# Patient Record
Sex: MALE | Race: WHITE | NOT HISPANIC OR LATINO | Employment: FULL TIME | URBAN - METROPOLITAN AREA
[De-identification: names, ages, dates, MRNs, and addresses within clinical notes are randomized per-mention and may not be internally consistent; named-entity substitution may affect disease eponyms.]

---

## 2023-10-05 ENCOUNTER — TELEPHONE (OUTPATIENT)
Dept: ORTHOPEDICS | Facility: CLINIC | Age: 38
End: 2023-10-05
Payer: MEDICAID

## 2023-10-05 NOTE — TELEPHONE ENCOUNTER
----- Message from Sera Townsend sent at 10/5/2023  3:24 PM CDT -----  Regarding: 3:30 appt runninf 15 mins late  Contact: pt  Pt running late to appt: Traffic         Provider: Kenan PATEL  Caller: Pt  Appt time:3:30  ETA:3:45  Asking, will still be seen? Please call

## 2023-10-30 ENCOUNTER — OFFICE VISIT (OUTPATIENT)
Dept: ORTHOPEDICS | Facility: CLINIC | Age: 38
End: 2023-10-30
Payer: MEDICAID

## 2023-10-30 VITALS
SYSTOLIC BLOOD PRESSURE: 161 MMHG | BODY MASS INDEX: 30.85 KG/M2 | HEIGHT: 72 IN | HEART RATE: 81 BPM | DIASTOLIC BLOOD PRESSURE: 94 MMHG | WEIGHT: 227.75 LBS

## 2023-10-30 DIAGNOSIS — M75.21 BICEPS TENDONITIS ON RIGHT: Primary | ICD-10-CM

## 2023-10-30 DIAGNOSIS — S46.911A STRAIN OF RIGHT SHOULDER, INITIAL ENCOUNTER: ICD-10-CM

## 2023-10-30 PROCEDURE — 3080F PR MOST RECENT DIASTOLIC BLOOD PRESSURE >= 90 MM HG: ICD-10-PCS | Mod: CPTII,,,

## 2023-10-30 PROCEDURE — 99203 OFFICE O/P NEW LOW 30 MIN: CPT | Mod: S$PBB,,,

## 2023-10-30 PROCEDURE — 1159F MED LIST DOCD IN RCRD: CPT | Mod: CPTII,,,

## 2023-10-30 PROCEDURE — 3077F SYST BP >= 140 MM HG: CPT | Mod: CPTII,,,

## 2023-10-30 PROCEDURE — 1159F PR MEDICATION LIST DOCUMENTED IN MEDICAL RECORD: ICD-10-PCS | Mod: CPTII,,,

## 2023-10-30 PROCEDURE — 99999 PR PBB SHADOW E&M-EST. PATIENT-LVL III: ICD-10-PCS | Mod: PBBFAC,,,

## 2023-10-30 PROCEDURE — 3008F BODY MASS INDEX DOCD: CPT | Mod: CPTII,,,

## 2023-10-30 PROCEDURE — 99213 OFFICE O/P EST LOW 20 MIN: CPT | Mod: PBBFAC,PN

## 2023-10-30 PROCEDURE — 1160F PR REVIEW ALL MEDS BY PRESCRIBER/CLIN PHARMACIST DOCUMENTED: ICD-10-PCS | Mod: CPTII,,,

## 2023-10-30 PROCEDURE — 3077F PR MOST RECENT SYSTOLIC BLOOD PRESSURE >= 140 MM HG: ICD-10-PCS | Mod: CPTII,,,

## 2023-10-30 PROCEDURE — 99203 PR OFFICE/OUTPT VISIT, NEW, LEVL III, 30-44 MIN: ICD-10-PCS | Mod: S$PBB,,,

## 2023-10-30 PROCEDURE — 3080F DIAST BP >= 90 MM HG: CPT | Mod: CPTII,,,

## 2023-10-30 PROCEDURE — 99999 PR PBB SHADOW E&M-EST. PATIENT-LVL III: CPT | Mod: PBBFAC,,,

## 2023-10-30 PROCEDURE — 1160F RVW MEDS BY RX/DR IN RCRD: CPT | Mod: CPTII,,,

## 2023-10-30 PROCEDURE — 3008F PR BODY MASS INDEX (BMI) DOCUMENTED: ICD-10-PCS | Mod: CPTII,,,

## 2023-10-30 RX ORDER — NAPROXEN 500 MG/1
500 TABLET ORAL 2 TIMES DAILY
Qty: 60 TABLET | Refills: 1 | Status: SHIPPED | OUTPATIENT
Start: 2023-10-30

## 2023-10-30 RX ORDER — METHADONE HYDROCHLORIDE 40 MG/1
TABLET ORAL
COMMUNITY
Start: 2020-11-17

## 2023-10-30 RX ORDER — METHYLPREDNISOLONE 4 MG/1
TABLET ORAL
Qty: 21 EACH | Refills: 0 | Status: SHIPPED | OUTPATIENT
Start: 2023-10-30 | End: 2023-11-20
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
## 2024-01-17 ENCOUNTER — TELEPHONE (OUTPATIENT)
Dept: ORTHOPEDICS | Facility: CLINIC | Age: 39
End: 2024-01-17
Payer: MEDICAID

## 2024-06-10 ENCOUNTER — HOSPITAL ENCOUNTER (EMERGENCY)
Facility: HOSPITAL | Age: 39
Discharge: HOME | End: 2024-06-11
Attending: STUDENT IN AN ORGANIZED HEALTH CARE EDUCATION/TRAINING PROGRAM
Payer: MEDICAID

## 2024-06-10 DIAGNOSIS — M79.89 LEG SWELLING: ICD-10-CM

## 2024-06-10 DIAGNOSIS — I82.431 ACUTE DEEP VEIN THROMBOSIS (DVT) OF POPLITEAL VEIN OF RIGHT LOWER EXTREMITY: Primary | ICD-10-CM

## 2024-06-10 LAB
ALBUMIN SERPL BCP-MCNC: 3.6 G/DL (ref 3.5–5.2)
ALP SERPL-CCNC: 89 U/L (ref 55–135)
ALT SERPL W/O P-5'-P-CCNC: 19 U/L (ref 10–44)
ANION GAP SERPL CALC-SCNC: 11 MMOL/L (ref 8–16)
AST SERPL-CCNC: 9 U/L (ref 10–40)
BILIRUB SERPL-MCNC: 0.4 MG/DL (ref 0.1–1)
BUN SERPL-MCNC: 11 MG/DL (ref 6–20)
CALCIUM SERPL-MCNC: 9 MG/DL (ref 8.7–10.5)
CHLORIDE SERPL-SCNC: 103 MMOL/L (ref 95–110)
CK SERPL-CCNC: 87 U/L (ref 20–200)
CO2 SERPL-SCNC: 24 MMOL/L (ref 23–29)
CREAT SERPL-MCNC: 0.9 MG/DL (ref 0.5–1.4)
EST. GFR  (NO RACE VARIABLE): >60 ML/MIN/1.73 M^2
GLUCOSE SERPL-MCNC: 87 MG/DL (ref 70–110)
HCV AB SERPL QL IA: NORMAL
HIV 1+2 AB+HIV1 P24 AG SERPL QL IA: NORMAL
POTASSIUM SERPL-SCNC: 3.6 MMOL/L (ref 3.5–5.1)
PROT SERPL-MCNC: 7.1 G/DL (ref 6–8.4)
SODIUM SERPL-SCNC: 138 MMOL/L (ref 136–145)

## 2024-06-10 PROCEDURE — 87389 HIV-1 AG W/HIV-1&-2 AB AG IA: CPT | Performed by: PHYSICIAN ASSISTANT

## 2024-06-10 PROCEDURE — 82550 ASSAY OF CK (CPK): CPT | Performed by: EMERGENCY MEDICINE

## 2024-06-10 PROCEDURE — 99284 EMERGENCY DEPT VISIT MOD MDM: CPT | Mod: 25

## 2024-06-10 PROCEDURE — 86803 HEPATITIS C AB TEST: CPT | Performed by: PHYSICIAN ASSISTANT

## 2024-06-10 PROCEDURE — 80053 COMPREHEN METABOLIC PANEL: CPT | Performed by: EMERGENCY MEDICINE

## 2024-06-10 PROCEDURE — 25000003 PHARM REV CODE 250: Performed by: EMERGENCY MEDICINE

## 2024-06-10 PROCEDURE — 85025 COMPLETE CBC W/AUTO DIFF WBC: CPT | Performed by: EMERGENCY MEDICINE

## 2024-06-10 RX ORDER — ACETAMINOPHEN 500 MG
1000 TABLET ORAL
Status: COMPLETED | OUTPATIENT
Start: 2024-06-10 | End: 2024-06-10

## 2024-06-10 RX ORDER — APIXABAN 5 MG (74)
KIT ORAL
Qty: 74 EACH | Refills: 0 | Status: SHIPPED | OUTPATIENT
Start: 2024-06-10

## 2024-06-10 RX ORDER — IBUPROFEN 400 MG/1
800 TABLET ORAL
Status: COMPLETED | OUTPATIENT
Start: 2024-06-10 | End: 2024-06-10

## 2024-06-10 RX ADMIN — IBUPROFEN 800 MG: 400 TABLET ORAL at 10:06

## 2024-06-10 RX ADMIN — ACETAMINOPHEN 1000 MG: 500 TABLET ORAL at 10:06

## 2024-06-10 RX ADMIN — APIXABAN 10 MG: 5 TABLET, FILM COATED ORAL at 11:06

## 2024-06-10 NOTE — FIRST PROVIDER EVALUATION
Medical screening examination initiated.  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    Pertinent physical exam:  R calf swollen    Brief workup plan:  US    Preliminary workup initiated; this workup will be continued and followed by the physician or advanced practice provider that is assigned to the patient when roomed.

## 2024-06-10 NOTE — Clinical Note
"Jarrod Vega"Sanjiv was seen and treated in our emergency department on 6/10/2024.  He may return to work on 06/13/2024.       If you have any questions or concerns, please don't hesitate to call.      Kurt Traore Jr., MD"

## 2024-06-11 VITALS
TEMPERATURE: 98 F | HEART RATE: 69 BPM | SYSTOLIC BLOOD PRESSURE: 134 MMHG | HEIGHT: 72 IN | WEIGHT: 260 LBS | RESPIRATION RATE: 16 BRPM | OXYGEN SATURATION: 99 % | BODY MASS INDEX: 35.21 KG/M2 | DIASTOLIC BLOOD PRESSURE: 84 MMHG

## 2024-06-11 LAB
BASOPHILS # BLD AUTO: 0.09 K/UL (ref 0–0.2)
BASOPHILS NFR BLD: 0.5 % (ref 0–1.9)
DIFFERENTIAL METHOD BLD: ABNORMAL
EOSINOPHIL # BLD AUTO: 0.2 K/UL (ref 0–0.5)
EOSINOPHIL NFR BLD: 1.2 % (ref 0–8)
ERYTHROCYTE [DISTWIDTH] IN BLOOD BY AUTOMATED COUNT: 14.4 % (ref 11.5–14.5)
HCT VFR BLD AUTO: 43.5 % (ref 40–54)
HGB BLD-MCNC: 13.6 G/DL (ref 14–18)
IMM GRANULOCYTES # BLD AUTO: 0.08 K/UL (ref 0–0.04)
IMM GRANULOCYTES NFR BLD AUTO: 0.4 % (ref 0–0.5)
LYMPHOCYTES # BLD AUTO: 3.1 K/UL (ref 1–4.8)
LYMPHOCYTES NFR BLD: 16.8 % (ref 18–48)
MCH RBC QN AUTO: 28.2 PG (ref 27–31)
MCHC RBC AUTO-ENTMCNC: 31.3 G/DL (ref 32–36)
MCV RBC AUTO: 90 FL (ref 82–98)
MONOCYTES # BLD AUTO: 1.2 K/UL (ref 0.3–1)
MONOCYTES NFR BLD: 6.6 % (ref 4–15)
NEUTROPHILS # BLD AUTO: 13.6 K/UL (ref 1.8–7.7)
NEUTROPHILS NFR BLD: 74.5 % (ref 38–73)
NRBC BLD-RTO: 0 /100 WBC
PLATELET # BLD AUTO: 197 K/UL (ref 150–450)
PMV BLD AUTO: 12.6 FL (ref 9.2–12.9)
RBC # BLD AUTO: 4.83 M/UL (ref 4.6–6.2)
WBC # BLD AUTO: 18.24 K/UL (ref 3.9–12.7)

## 2024-06-11 RX ORDER — CEPHALEXIN 500 MG/1
500 CAPSULE ORAL 4 TIMES DAILY
Qty: 20 CAPSULE | Refills: 0 | Status: SHIPPED | OUTPATIENT
Start: 2024-06-11 | End: 2024-06-16

## 2024-06-11 NOTE — PROVIDER PROGRESS NOTES - EMERGENCY DEPT.
Encounter Date: 6/10/2024    ED Physician Progress Notes        ED Course: I, Kurt Traore MD have assumed care of this patient from Dr. Morton. Patient is a   38-year-old presenting with lower extremity swelling.  Ultrasound noted for DVT.    At the time of signout plan was pending   Reassessment and laboratory testing.      Laboratory testing noted for leukocytosis.  On reassessment patient right lower extremity with mild erythema noted swelling no significant warmth to touch.  DP PT pulses 2+ in full compartments soft.  Noted mild vesicular rash on patient's upper medial leg.  Patient denies any infectious symptoms including fever chills night sweats cough nausea vomiting change in bowel or bladder habits or URI type symptoms.  Given presentation will DC home with Keflex for possible skin infection patient given return precautions including fevers, redness, swelling, pain patient understanding of plan safe for plan discharge home at this time patient has no further questions.    Medications given in the ED:    Medications   acetaminophen tablet 1,000 mg (1,000 mg Oral Given 6/10/24 2213)   ibuprofen tablet 800 mg (800 mg Oral Given 6/10/24 2213)       Disposition: d/c home     Impression:  DVT, leukocytosis

## 2024-06-11 NOTE — DISCHARGE INSTRUCTIONS
You are prescribed Eliquis for DVT, please follow the instruction in the package.  You will take 2 tablets twice daily for 7 days, then 1 tablet twice daily for the rest.   You can  the medication at [facility] 24/7  Referral to hematology for follow-up with your deep vein thrombosis    Please return if you began having worsening swelling, pain, fevers, redness or discoloration of leg or generally feeling worse.

## 2024-06-11 NOTE — ED TRIAGE NOTES
Pt presents to the ED with complaints of R. Leg pain and swelling. Pt had pain in R. Thigh starting Friday. Noticed Saturday pain, redness, and swelling in R. Calf, worse with standing and ambulating. Pt denies injury to the area.

## 2024-06-11 NOTE — ED PROVIDER NOTES
Encounter Date: 6/10/2024       History     Chief Complaint   Patient presents with    Leg Pain     Pain and swelling  to r leg, started r groin, in rehab clinic no narcotics per pt     38 y.o. M, presents to the ED for right lower leg swelling. Patient reports the right calf pain and swelling since 2 days ago, however, he noticed that he had right side groin pain prior to the swelling. 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 Denies any injury to his right leg. Denies chest pain, or shortness of breath.         Review of patient's allergies indicates:  No Known Allergies  Past Medical History:   Diagnosis Date    Pinched nerve      Past Surgical History:   Procedure Laterality Date    KNEE SURGERY Left      No family history on file.  Social History     Tobacco Use    Smoking status: Every Day     Current packs/day: 0.75     Types: Cigarettes   Substance Use Topics    Alcohol use: No    Drug use: No     Review of Systems    Physical Exam     Initial Vitals [06/10/24 1759]   BP Pulse Resp Temp SpO2   (!) 193/105 96 18 98.8 °F (37.1 °C) 97 %      MAP       --         Physical Exam    Nursing note and vitals reviewed.  Constitutional: He appears well-developed. No distress.   HENT:   Head: Normocephalic and atraumatic.   Nose: Nose normal.   Eyes: Conjunctivae and EOM are normal. Pupils are equal, round, and reactive to light.   Neck: No JVD present.   Normal range of motion.  Cardiovascular:  Normal rate, regular rhythm and normal heart sounds.           Pulmonary/Chest: Breath sounds normal. No respiratory distress.   Abdominal: Abdomen is soft. He exhibits no distension. There is no abdominal tenderness.   Musculoskeletal:         General: Tenderness and edema (right lower leg, positive Homman's test, palpable DP and TP pulses) present. Normal range of motion.      Cervical back: Normal range of motion.     Neurological: He is alert and oriented to  person, place, and time. He has normal strength. No cranial nerve deficit.   Skin: Skin is warm and dry. Capillary refill takes less than 2 seconds.         ED Course   Procedures  Labs Reviewed   HIV 1 / 2 ANTIBODY   HEPATITIS C ANTIBODY   CBC W/ AUTO DIFFERENTIAL   COMPREHENSIVE METABOLIC PANEL   CK          Imaging Results               US Lower Extremity Veins Right (Final result)  Result time 06/10/24 21:44:06      Final result by Phu Astudillo MD (06/10/24 21:44:06)                   Impression:      Right popliteal vein thrombosed.    This report was flagged in Epic as abnormal.    The critical information above was relayed directly by Phu Astudillo MD by [facility] secure chat to Gus Morton DO on 6/10/2024 at 21:43.      Electronically signed by: Phu Astudillo MD  Date:    06/10/2024  Time:    21:44               Narrative:    EXAMINATION:  US LOWER EXTREMITY VEINS RIGHT    CLINICAL HISTORY:  Other specified soft tissue disorders    TECHNIQUE:  Duplex and color flow Doppler evaluation and graded compression of the right lower extremity veins was performed.    COMPARISON:  None    FINDINGS:  Right thigh veins: The common femoral, femoral, upper greater saphenous, and deep femoral veins are patent and free of thrombus. The veins are normally compressible and have normal phasic flow and augmentation response.  Right popliteal vein thrombosed.    Right calf veins: The visualized calf veins are patent.    Contralateral CFV: The contralateral (left) common femoral vein is patent and free of thrombus.    Miscellaneous: None                                       Medications   apixaban tablet 10 mg (has no administration in time range)   acetaminophen tablet 1,000 mg (1,000 mg Oral Given 6/10/24 2213)   ibuprofen tablet 800 mg (800 mg Oral Given 6/10/24 2213)     Medical Decision Making  38 y.o. M, presents to the ED for right lower leg swelling. Concerning for DVT, doubt cellulitis or acute fracture, no  sign of PE, will obtain US DVT and labs to r/o rhabdo.     Amount and/or Complexity of Data Reviewed  Labs: ordered. Decision-making details documented in ED Course.  Radiology: ordered and independent interpretation performed. Decision-making details documented in ED Course.     Details: Right popliteal vein thrombosed    Risk  OTC drugs.  Prescription drug management.                                      Clinical Impression:  Final diagnoses:  [M79.89] Leg swelling  [I82.431] Acute deep vein thrombosis (DVT) of popliteal vein of right lower extremity (Primary)                 Christian Fishman MD  Resident  06/10/24 5189

## 2024-06-18 ENCOUNTER — OFFICE VISIT (OUTPATIENT)
Dept: HEMATOLOGY/ONCOLOGY | Facility: CLINIC | Age: 39
End: 2024-06-18
Payer: MEDICAID

## 2024-06-18 VITALS
TEMPERATURE: 98 F | SYSTOLIC BLOOD PRESSURE: 154 MMHG | HEIGHT: 73 IN | HEART RATE: 78 BPM | BODY MASS INDEX: 29.8 KG/M2 | RESPIRATION RATE: 20 BRPM | OXYGEN SATURATION: 98 % | DIASTOLIC BLOOD PRESSURE: 87 MMHG | WEIGHT: 224.88 LBS

## 2024-06-18 DIAGNOSIS — I82.431 ACUTE DEEP VEIN THROMBOSIS (DVT) OF POPLITEAL VEIN OF RIGHT LOWER EXTREMITY: ICD-10-CM

## 2024-06-18 PROCEDURE — 3008F BODY MASS INDEX DOCD: CPT | Mod: CPTII,,, | Performed by: INTERNAL MEDICINE

## 2024-06-18 PROCEDURE — 99213 OFFICE O/P EST LOW 20 MIN: CPT | Mod: PBBFAC,PN | Performed by: INTERNAL MEDICINE

## 2024-06-18 PROCEDURE — 99999 PR PBB SHADOW E&M-EST. PATIENT-LVL III: CPT | Mod: PBBFAC,,, | Performed by: INTERNAL MEDICINE

## 2024-06-18 PROCEDURE — 3079F DIAST BP 80-89 MM HG: CPT | Mod: CPTII,,, | Performed by: INTERNAL MEDICINE

## 2024-06-18 PROCEDURE — 3077F SYST BP >= 140 MM HG: CPT | Mod: CPTII,,, | Performed by: INTERNAL MEDICINE

## 2024-06-18 PROCEDURE — 1159F MED LIST DOCD IN RCRD: CPT | Mod: CPTII,,, | Performed by: INTERNAL MEDICINE

## 2024-06-18 PROCEDURE — 99204 OFFICE O/P NEW MOD 45 MIN: CPT | Mod: S$PBB,,, | Performed by: INTERNAL MEDICINE

## 2024-06-18 NOTE — PROGRESS NOTES
HPI    38 y.o. M, was presents to the ED on 6/10/24 for right lower leg swelling. Patient reports the right calf pain and swelling since 6/8/24 days ago, however, he noticed that he had right side groin pain prior to the swelling. 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 Denies any injury to his right leg. Denies chest pain, or shortness of breath.       Past Medical History:   Diagnosis Date    Pinched nerve      Social History     Socioeconomic History    Marital status: Single   Tobacco Use    Smoking status: Every Day     Current packs/day: 0.75     Types: Cigarettes   Substance and Sexual Activity    Alcohol use: No    Drug use: No     Social Determinants of Health     Financial Resource Strain: Low Risk  (6/12/2024)    Overall Financial Resource Strain (CARDIA)     Difficulty of Paying Living Expenses: Not hard at all   Food Insecurity: No Food Insecurity (6/12/2024)    Hunger Vital Sign     Worried About Running Out of Food in the Last Year: Never true     Ran Out of Food in the Last Year: Never true   Physical Activity: Sufficiently Active (6/12/2024)    Exercise Vital Sign     Days of Exercise per Week: 5 days     Minutes of Exercise per Session: 150+ min   Stress: Stress Concern Present (6/12/2024)    [employer] - Occupational Stress Questionnaire     Feeling of Stress : Rather much   Housing Stability: Unknown (6/12/2024)    Housing Stability Vital Sign     Unable to Pay for Housing in the Last Year: No         Subjective      Review of Systems   Constitutional: Negative for appetite change, fatigue and unexpected weight change.   HENT: Negative for mouth sores.   Eyes: Negative for visual disturbance.   Respiratory: Negative for cough and shortness of breath.   Cardiovascular: Negative for chest pain.   Gastrointestinal: Negative for diarrhea.   Genitourinary: Negative for frequency.   Musculoskeletal:  Negative for back pain.   Skin: Negative for rash.   Neurological: Negative for headaches.   Hematological: Negative for adenopathy.   Psychiatric/Behavioral: The patient is not nervous/anxious.   All other systems reviewed and are negative.     Objective    Physical Exam   Vitals:    06/18/24 0902   BP: (!) 154/87   Pulse: 78   Resp: 20   Temp: 97.6 °F (36.4 °C)       Constitutional: patient is oriented to person, place, and time. patient appears well-developed and well-nourished. No distress.   HENT:   Right Ear: External ear normal.   Left Ear: External ear normal.   Nose: Nose normal.   Mouth/Throat: Oropharynx is clear and moist. No oropharyngeal exudate.   Teeth, gums and lips are normal   No sinus tenderness   Palate, tongue, posterior pharynx are normal   Eyes: Conjunctivae and lids are normal.   Neck: Trachea normal and normal range of motion. No thyromegaly   Cardiovascular: Normal rate, regular rhythm, normal heart sounds, intact distal pulses and normal pulses.   No murmur heard.   No edema, no tenderness in the extremities.   Pulmonary/Chest: Effort normal and breath sounds normal. No accessory muscle usage. patient has no wheezes..   Abdominal: Soft. Normal appearance and bowel sounds are normal. patient exhibits no distension and no mass. There is no hepatosplenomegaly. There is no tenderness.   Musculoskeletal: Normal range of motion.   Gait is normal   No clubbing, cyanosis     Lymphadenopathy:   Head (right side): No submental and no submandibular adenopathy present.   Head (left side): No submental and no submandibular adenopathy present.   patient has no cervical adenopathy.   Right: No supraclavicular adenopathy present.   Left: No supraclavicular adenopathy present.   Neurological: patient is alert and oriented to person, place, and time. patient has normal strength and normal reflexes. No sensory deficit. Gait normal.   Skin: Skin is warm, dry and intact. No bruising, no lesion and no rash  noted. No cyanosis. Nails show no clubbing.   No lesions   Psychiatric: patient has a normal mood and affect. patient speech is normal and behavior is normal. Judgment normal. Cognition and memory are normal.   Vitals reviewed.     Lab Results   Component Value Date    WBC 18.24 (H) 06/10/2024    HGB 13.6 (L) 06/10/2024    HCT 43.5 06/10/2024    MCV 90 06/10/2024     06/10/2024       CMP  Sodium   Date Value Ref Range Status   06/10/2024 138 136 - 145 mmol/L Final     Potassium   Date Value Ref Range Status   06/10/2024 3.6 3.5 - 5.1 mmol/L Final     Chloride   Date Value Ref Range Status   06/10/2024 103 95 - 110 mmol/L Final     CO2   Date Value Ref Range Status   06/10/2024 24 23 - 29 mmol/L Final     Glucose   Date Value Ref Range Status   06/10/2024 87 70 - 110 mg/dL Final     BUN   Date Value Ref Range Status   06/10/2024 11 6 - 20 mg/dL Final     Creatinine   Date Value Ref Range Status   06/10/2024 0.9 0.5 - 1.4 mg/dL Final     Calcium   Date Value Ref Range Status   06/10/2024 9.0 8.7 - 10.5 mg/dL Final     Total Protein   Date Value Ref Range Status   06/10/2024 7.1 6.0 - 8.4 g/dL Final     Albumin   Date Value Ref Range Status   06/10/2024 3.6 3.5 - 5.2 g/dL Final     Total Bilirubin   Date Value Ref Range Status   06/10/2024 0.4 0.1 - 1.0 mg/dL Final     Comment:     For infants and newborns, interpretation of results should be based  on gestational age, weight and in agreement with clinical  observations.    Premature Infant recommended reference ranges:  Up to 24 hours.............<8.0 mg/dL  Up to 48 hours............<12.0 mg/dL  3-5 days..................<15.0 mg/dL  6-29 days.................<15.0 mg/dL       Alkaline Phosphatase   Date Value Ref Range Status   06/10/2024 89 55 - 135 U/L Final     AST   Date Value Ref Range Status   06/10/2024 9 (L) 10 - 40 U/L Final     ALT   Date Value Ref Range Status   06/10/2024 19 10 - 44 U/L Final     Anion Gap   Date Value Ref Range Status    06/10/2024 11 8 - 16 mmol/L Final     eGFR   Date Value Ref Range Status   06/10/2024 >60.0 >60 mL/min/1.73 m^2 Final       Impression:     Right popliteal vein thrombosed.  Unclear whether not this is provoked or spontaneous.  Patient does endorse long hours of driving daily.  However from my discussion with patient he spent anywhere between 3-4 hours daily in car.  Does not seen that will be a provoked  factor.  In addition patient denies any drugs or hormone placement/EPO.    Family history is negative for DVT except for maternal grandmother    Currently on anticoagulation Eliquis 5 mg p.o. b.i.d.    CT chest abd pelvi with IV contrast hypercoagulable workup RTC with results    Heavy smoking history 1 pack daily for the past 20 years.             Assessment        Plan    Acute deep vein thrombosis (DVT) of popliteal vein of right lower extremity  -     Ambulatory referral/consult to Hematology / Oncology

## 2024-06-19 ENCOUNTER — TELEPHONE (OUTPATIENT)
Dept: HEMATOLOGY/ONCOLOGY | Facility: CLINIC | Age: 39
End: 2024-06-19
Payer: MEDICAID

## 2024-06-19 NOTE — TELEPHONE ENCOUNTER
Outgoing call to [facility] regarding prior auth for Fac5 and Proth mut labs. Spoke to Lucinda who stated as long as Dr. Sanchez is in network with patient's plan that no prior auth is required for these two genetic labs.

## 2024-06-25 ENCOUNTER — PATIENT MESSAGE (OUTPATIENT)
Dept: HEMATOLOGY/ONCOLOGY | Facility: CLINIC | Age: 39
End: 2024-06-25
Payer: MEDICAID

## 2024-06-28 DIAGNOSIS — I82.431 ACUTE DEEP VEIN THROMBOSIS (DVT) OF POPLITEAL VEIN OF RIGHT LOWER EXTREMITY: Primary | ICD-10-CM

## 2024-07-09 ENCOUNTER — TELEPHONE (OUTPATIENT)
Dept: HEMATOLOGY/ONCOLOGY | Facility: CLINIC | Age: 39
End: 2024-07-09
Payer: MEDICAID
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
## 2024-07-16 ENCOUNTER — OFFICE VISIT (OUTPATIENT)
Dept: HEMATOLOGY/ONCOLOGY | Facility: CLINIC | Age: 39
End: 2024-07-16
Payer: MEDICAID

## 2024-07-16 VITALS
DIASTOLIC BLOOD PRESSURE: 89 MMHG | WEIGHT: 224.88 LBS | HEART RATE: 89 BPM | SYSTOLIC BLOOD PRESSURE: 140 MMHG | OXYGEN SATURATION: 96 % | BODY MASS INDEX: 29.8 KG/M2 | TEMPERATURE: 99 F | HEIGHT: 73 IN | RESPIRATION RATE: 14 BRPM

## 2024-07-16 DIAGNOSIS — I82.431 ACUTE DEEP VEIN THROMBOSIS (DVT) OF POPLITEAL VEIN OF RIGHT LOWER EXTREMITY: Primary | ICD-10-CM

## 2024-07-16 PROCEDURE — 3079F DIAST BP 80-89 MM HG: CPT | Mod: CPTII,,, | Performed by: INTERNAL MEDICINE

## 2024-07-16 PROCEDURE — 3077F SYST BP >= 140 MM HG: CPT | Mod: CPTII,,, | Performed by: INTERNAL MEDICINE

## 2024-07-16 PROCEDURE — 3008F BODY MASS INDEX DOCD: CPT | Mod: CPTII,,, | Performed by: INTERNAL MEDICINE

## 2024-07-16 PROCEDURE — 99213 OFFICE O/P EST LOW 20 MIN: CPT | Mod: PBBFAC,PN | Performed by: INTERNAL MEDICINE

## 2024-07-16 PROCEDURE — 99999 PR PBB SHADOW E&M-EST. PATIENT-LVL III: CPT | Mod: PBBFAC,,, | Performed by: INTERNAL MEDICINE

## 2024-07-16 PROCEDURE — 99214 OFFICE O/P EST MOD 30 MIN: CPT | Mod: S$PBB,,, | Performed by: INTERNAL MEDICINE

## 2024-07-16 PROCEDURE — 1159F MED LIST DOCD IN RCRD: CPT | Mod: CPTII,,, | Performed by: INTERNAL MEDICINE

## 2024-07-16 NOTE — PROGRESS NOTES
HPI    38 y.o. M, was presents to the ED on 6/10/24 for right lower leg swelling. Patient reports the right calf pain and swelling since 6/8/24 days ago, however, he noticed that he had right side groin pain prior to the swelling. 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 Denies any injury to his right leg. Denies chest pain, or shortness of breath.       Past Medical History:   Diagnosis Date    Pinched nerve      Social History     Socioeconomic History    Marital status: Single   Tobacco Use    Smoking status: Every Day     Current packs/day: 0.75     Types: Cigarettes   Substance and Sexual Activity    Alcohol use: No    Drug use: No     Social Determinants of Health     Financial Resource Strain: Low Risk  (6/12/2024)    Overall Financial Resource Strain (CARDIA)     Difficulty of Paying Living Expenses: Not hard at all   Food Insecurity: No Food Insecurity (6/12/2024)    Hunger Vital Sign     Worried About Running Out of Food in the Last Year: Never true     Ran Out of Food in the Last Year: Never true   Physical Activity: Sufficiently Active (6/12/2024)    Exercise Vital Sign     Days of Exercise per Week: 5 days     Minutes of Exercise per Session: 150+ min   Stress: Stress Concern Present (6/12/2024)    [employer] - Occupational Stress Questionnaire     Feeling of Stress : Rather much   Housing Stability: Unknown (6/12/2024)    Housing Stability Vital Sign     Unable to Pay for Housing in the Last Year: No         Subjective      Review of Systems   Constitutional: Negative for appetite change, fatigue and unexpected weight change.   HENT: Negative for mouth sores.   Eyes: Negative for visual disturbance.   Respiratory: Negative for cough and shortness of breath.   Cardiovascular: Negative for chest pain.   Gastrointestinal: Negative for diarrhea.   Genitourinary: Negative for frequency.   Musculoskeletal:  Negative for back pain.   Skin: Negative for rash.   Neurological: Negative for headaches.   Hematological: Negative for adenopathy.   Psychiatric/Behavioral: The patient is not nervous/anxious.   All other systems reviewed and are negative.     Objective    Physical Exam   Vitals:    07/16/24 1449   BP: (!) 140/89   Pulse: 89   Resp: 14   Temp: 98.6 °F (37 °C)       Constitutional: patient is oriented to person, place, and time. patient appears well-developed and well-nourished. No distress.   HENT:   Right Ear: External ear normal.   Left Ear: External ear normal.   Nose: Nose normal.   Mouth/Throat: Oropharynx is clear and moist. No oropharyngeal exudate.   Teeth, gums and lips are normal   No sinus tenderness   Palate, tongue, posterior pharynx are normal   Eyes: Conjunctivae and lids are normal.   Neck: Trachea normal and normal range of motion. No thyromegaly   Cardiovascular: Normal rate, regular rhythm, normal heart sounds, intact distal pulses and normal pulses.   No murmur heard.   No edema, no tenderness in the extremities.   Pulmonary/Chest: Effort normal and breath sounds normal. No accessory muscle usage. patient has no wheezes..   Abdominal: Soft. Normal appearance and bowel sounds are normal. patient exhibits no distension and no mass. There is no hepatosplenomegaly. There is no tenderness.   Musculoskeletal: Normal range of motion.   Gait is normal   No clubbing, cyanosis     Lymphadenopathy:   Head (right side): No submental and no submandibular adenopathy present.   Head (left side): No submental and no submandibular adenopathy present.   patient has no cervical adenopathy.   Right: No supraclavicular adenopathy present.   Left: No supraclavicular adenopathy present.   Neurological: patient is alert and oriented to person, place, and time. patient has normal strength and normal reflexes. No sensory deficit. Gait normal.   Skin: Skin is warm, dry and intact. No bruising, no lesion and no rash  noted. No cyanosis. Nails show no clubbing.   No lesions   Psychiatric: patient has a normal mood and affect. patient speech is normal and behavior is normal. Judgment normal. Cognition and memory are normal.   Vitals reviewed.     Lab Results   Component Value Date    WBC 10.86 07/05/2024    HGB 14.0 07/05/2024    HCT 42.8 07/05/2024    MCV 88 07/05/2024     07/05/2024       CMP  Sodium   Date Value Ref Range Status   07/05/2024 137 136 - 145 mmol/L Final     Potassium   Date Value Ref Range Status   07/05/2024 4.0 3.5 - 5.1 mmol/L Final     Chloride   Date Value Ref Range Status   07/05/2024 99 95 - 110 mmol/L Final     CO2   Date Value Ref Range Status   07/05/2024 28 23 - 29 mmol/L Final     Glucose   Date Value Ref Range Status   07/05/2024 106 70 - 110 mg/dL Final     BUN   Date Value Ref Range Status   07/05/2024 23 (H) 6 - 20 mg/dL Final     Creatinine   Date Value Ref Range Status   07/05/2024 0.9 0.5 - 1.4 mg/dL Final     Calcium   Date Value Ref Range Status   07/05/2024 9.0 8.7 - 10.5 mg/dL Final     Total Protein   Date Value Ref Range Status   07/05/2024 7.2 6.0 - 8.4 g/dL Final     Albumin   Date Value Ref Range Status   07/05/2024 3.7 3.5 - 5.2 g/dL Final     Total Bilirubin   Date Value Ref Range Status   07/05/2024 0.3 0.1 - 1.0 mg/dL Final     Comment:     For infants and newborns, interpretation of results should be based  on gestational age, weight and in agreement with clinical  observations.    Premature Infant recommended reference ranges:  Up to 24 hours.............<8.0 mg/dL  Up to 48 hours............<12.0 mg/dL  3-5 days..................<15.0 mg/dL  6-29 days.................<15.0 mg/dL       Alkaline Phosphatase   Date Value Ref Range Status   07/05/2024 102 55 - 135 U/L Final     AST   Date Value Ref Range Status   07/05/2024 10 10 - 40 U/L Final     ALT   Date Value Ref Range Status   07/05/2024 22 10 - 44 U/L Final     Anion Gap   Date Value Ref Range Status   07/05/2024 10 8  - 16 mmol/L Final     eGFR   Date Value Ref Range Status   07/05/2024 >60.0 >60 mL/min/1.73 m^2 Final       Impression:     Right popliteal vein thrombosed.  Unclear whether not this is provoked or spontaneous.  Patient does endorse long hours of driving daily.  However from my discussion with patient he spent anywhere between 3-4 hours daily in car.  Does not seen that will be a provoked  factor.  In addition patient denies any drugs or hormone placement/EPO.    Family history is negative for DVT except for maternal grandmother    Currently on anticoagulation Eliquis 5 mg p.o. b.i.d.    CT chest abd pelvi with IV contrast hypercoagulable workup RTC with results    Heavy smoking history 1 pack daily for the past 20 years.    Patient returns to clinic for evaluation of hypercoagulable workup.  There is no evidence of lupus anticoagulant.  Negative on factor 5 Leiden prothrombin gene mutations.  No evidence of antiphospholipid lipid antibodies.  His Beta-2 glycoprotein antibodies slightly elevated but is not significant.  CTA shows no filling defect, no evidence of pulmonary embolism.  US lower ext 6/10/24 right popliteal vein thrombosis.    I will see him in September repeat blood work and ultrasound       Assessment        Plan    There are no diagnoses linked to this encounter.

## 2024-08-20 ENCOUNTER — PATIENT MESSAGE (OUTPATIENT)
Dept: HEMATOLOGY/ONCOLOGY | Facility: CLINIC | Age: 39
End: 2024-08-20
Payer: MEDICAID

## 2024-08-20 DIAGNOSIS — I82.431 ACUTE DEEP VEIN THROMBOSIS (DVT) OF POPLITEAL VEIN OF RIGHT LOWER EXTREMITY: Primary | ICD-10-CM

## 2024-08-23 ENCOUNTER — OFFICE VISIT (OUTPATIENT)
Dept: HEMATOLOGY/ONCOLOGY | Facility: CLINIC | Age: 39
End: 2024-08-23
Payer: MEDICAID

## 2024-08-23 DIAGNOSIS — I82.431 ACUTE DEEP VEIN THROMBOSIS (DVT) OF POPLITEAL VEIN OF RIGHT LOWER EXTREMITY: Primary | ICD-10-CM

## 2024-08-23 DIAGNOSIS — R19.7 DIARRHEA, UNSPECIFIED TYPE: ICD-10-CM

## 2024-08-23 DIAGNOSIS — D50.0 IRON DEFICIENCY ANEMIA DUE TO CHRONIC BLOOD LOSS: ICD-10-CM

## 2024-08-23 RX ORDER — SODIUM CHLORIDE 0.9 % (FLUSH) 0.9 %
10 SYRINGE (ML) INJECTION
OUTPATIENT
Start: 2024-08-23

## 2024-08-23 RX ORDER — DIPHENHYDRAMINE HYDROCHLORIDE 50 MG/ML
50 INJECTION INTRAMUSCULAR; INTRAVENOUS ONCE AS NEEDED
OUTPATIENT
Start: 2024-08-23

## 2024-08-23 RX ORDER — HEPARIN 100 UNIT/ML
500 SYRINGE INTRAVENOUS
OUTPATIENT
Start: 2024-08-23

## 2024-08-23 RX ORDER — EPINEPHRINE 0.3 MG/.3ML
0.3 INJECTION SUBCUTANEOUS ONCE AS NEEDED
OUTPATIENT
Start: 2024-08-23

## 2024-08-23 NOTE — PROGRESS NOTES
The patient location is: home   Visit type: Virtual visit with synchronous audio and video  Face-to-face or time spent with patient on the encounter:10 min  Total time spent on and for  this encounter which includes non face-to-face time preparing to see patient, review of tests, obtaining and or reviewing separately obtained records documenting clinical information in the electronic or other health records, independently interpreting results which is not separately reported ,and communicating results to the patient/family/caregiver and in care coordination and treatment planning/communicating with pharmacy for prescriptions/addressing social needs/arranging follow-up and or referrals :30 min    Each patient I provide medical services by telemedicine is:  (1) informed of the relationship between the physician and patient and the respective role of any other health care provider with respect to management of the patient; and (2) notified that he or she may decline to receive medical services by telemedicine and may withdraw from such care at any time.  This is a video visit therefore some elements of the physical exam such as vital signs, heart sounds are breath sounds are not included and may be included if found in recent clinic notes of other providers assessing same patient. Any symptoms or signs that were visualized were stated by the patient may be included in this note.     HPI    38 y.o. M, was presents to the ED on 6/10/24 for right lower leg swelling. Patient reports the right calf pain and swelling since 6/8/24 days ago, however, he noticed that he had right side groin pain prior to the swelling. 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 Denies any injury to his right leg. Denies chest pain, or shortness of breath.     8/23/2024:  He presents today for follow-up sooner than scheduled with complaints of poor tolerance to Eliquis.  Patient is  complaining of abdominal pain infrequent episodes of diarrhea ever since he started Eliquis.      Past Medical History:   Diagnosis Date    Pinched nerve      Social History     Socioeconomic History    Marital status: Single   Tobacco Use    Smoking status: Every Day     Current packs/day: 0.75     Types: Cigarettes   Substance and Sexual Activity    Alcohol use: No    Drug use: No     Social Determinants of Health     Financial Resource Strain: Low Risk  (6/12/2024)    Overall Financial Resource Strain (CARDIA)     Difficulty of Paying Living Expenses: Not hard at all   Food Insecurity: No Food Insecurity (6/12/2024)    Hunger Vital Sign     Worried About Running Out of Food in the Last Year: Never true     Ran Out of Food in the Last Year: Never true   Physical Activity: Sufficiently Active (6/12/2024)    Exercise Vital Sign     Days of Exercise per Week: 5 days     Minutes of Exercise per Session: 150+ min   Stress: Stress Concern Present (6/12/2024)    [employer] - Occupational Stress Questionnaire     Feeling of Stress : Rather much   Housing Stability: Unknown (6/12/2024)    Housing Stability Vital Sign     Unable to Pay for Housing in the Last Year: No         Subjective      Review of Systems   Constitutional: Negative for appetite change, fatigue and unexpected weight change.   HENT: Negative for mouth sores.   Eyes: Negative for visual disturbance.   Respiratory: Negative for cough and shortness of breath.   Cardiovascular: Negative for chest pain.   Gastrointestinal: + for abdominal pain and diarrhea  Genitourinary: Negative for frequency.   Musculoskeletal: Negative for back pain.   Skin: Negative for rash.   Neurological: Negative for headaches.   Hematological: Negative for adenopathy.   Psychiatric/Behavioral: The patient is not nervous/anxious.   All other systems reviewed and are negative.     Objective    Physical Exam   There were no vitals filed for this  visit.  GENERAL: appears well-built, well-nourished.  No anxiety, no agitation, and in no distress.  Patient is awake, alert, oriented and cooperative.  HEENT:  Showed no congestion. Trachea is central no obvious icterus or pallor noted via video.  NECK:  Supple.  No JVD. No obvious cervical submental or supraclavicular adenopathy.  RS:the visualized portion of  Chest expands well. chest appears symmetric, no audible wheezes.  ABDOMEN: the visualized portion of  abdomen appears undistended.  EXTREMITIES:  Without edema.  NEUROLOGICAL:  The patient is appropriate, higher functions are normal.  No neuro deficits.  No gait abnormality noted.  No confusion, no speech impediment. Cranial nerves are intact and show no deficit. No motor deficits noted through video.  SKIN MUSCULOSKELETAL: no joint or skeletal deformity, ambulating around room, no clubbing of nails. .     Lab Results   Component Value Date    WBC 10.87 08/22/2024    HGB 13.5 (L) 08/22/2024    HCT 41.7 08/22/2024    MCV 88 08/22/2024     08/22/2024       CMP  Sodium   Date Value Ref Range Status   08/22/2024 138 136 - 145 mmol/L Final     Potassium   Date Value Ref Range Status   08/22/2024 3.6 3.5 - 5.1 mmol/L Final     Chloride   Date Value Ref Range Status   08/22/2024 104 95 - 110 mmol/L Final     CO2   Date Value Ref Range Status   08/22/2024 23 23 - 29 mmol/L Final     Glucose   Date Value Ref Range Status   08/22/2024 104 70 - 110 mg/dL Final     BUN   Date Value Ref Range Status   08/22/2024 9 6 - 20 mg/dL Final     Creatinine   Date Value Ref Range Status   08/22/2024 1.0 0.5 - 1.4 mg/dL Final     Calcium   Date Value Ref Range Status   08/22/2024 9.1 8.7 - 10.5 mg/dL Final     Total Protein   Date Value Ref Range Status   08/22/2024 7.1 6.0 - 8.4 g/dL Final     Albumin   Date Value Ref Range Status   08/22/2024 3.6 3.5 - 5.2 g/dL Final     Total Bilirubin   Date Value Ref Range Status   08/22/2024 0.3 0.1 - 1.0 mg/dL Final     Comment:      For infants and newborns, interpretation of results should be based  on gestational age, weight and in agreement with clinical  observations.    Premature Infant recommended reference ranges:  Up to 24 hours.............<8.0 mg/dL  Up to 48 hours............<12.0 mg/dL  3-5 days..................<15.0 mg/dL  6-29 days.................<15.0 mg/dL       Alkaline Phosphatase   Date Value Ref Range Status   08/22/2024 91 55 - 135 U/L Final     AST   Date Value Ref Range Status   08/22/2024 12 10 - 40 U/L Final     ALT   Date Value Ref Range Status   08/22/2024 25 10 - 44 U/L Final     Anion Gap   Date Value Ref Range Status   08/22/2024 11 8 - 16 mmol/L Final     eGFR   Date Value Ref Range Status   08/22/2024 >60.0 >60 mL/min/1.73 m^2 Final       Impression:     Right popliteal vein thrombosed.  Unclear whether not this is provoked or spontaneous.  Patient does endorse long hours of driving daily.  However from my discussion with patient he spent anywhere between 3-4 hours daily in car.  Does not seen that will be a provoked  factor.  In addition patient denies any drugs or hormone placement/EPO.    Family history is negative for DVT except for maternal grandmother    Currently on anticoagulation Eliquis 5 mg p.o. b.i.d.    CT chest abd pelvi with IV contrast hypercoagulable workup RTC with results    Heavy smoking history 1 pack daily for the past 20 years.    Patient returns to clinic for evaluation of hypercoagulable workup.  There is no evidence of lupus anticoagulant.  Negative on factor 5 Leiden prothrombin gene mutations.  No evidence of antiphospholipid lipid antibodies.  His Beta-2 glycoprotein antibodies slightly elevated but is not significant.  CTA shows no filling defect, no evidence of pulmonary embolism.  US lower ext 6/10/24 right popliteal vein thrombosis.    I will see him in September repeat blood work and ultrasound       Assessment        Plan    8/23/2024:  Labs reviewed in detail with the  patient.  All questions and concerns were addressed and answered.  His lab work showed iron-deficiency anemia.  I will proceed with IV iron due to poor tolerance of p.o. iron.  I will stop his Eliquis and start him on Xarelto 20 mg p.o. daily.  I have placed a referral to GI to evaluate frequent diarrhea and iron-deficiency.  He will keep follow-up with MD

## 2024-09-25 ENCOUNTER — PATIENT MESSAGE (OUTPATIENT)
Dept: HEMATOLOGY/ONCOLOGY | Facility: CLINIC | Age: 39
End: 2024-09-25
Payer: MEDICAID

## 2024-10-10 ENCOUNTER — OFFICE VISIT (OUTPATIENT)
Dept: HEMATOLOGY/ONCOLOGY | Facility: CLINIC | Age: 39
End: 2024-10-10
Payer: MEDICAID

## 2024-10-10 VITALS
HEART RATE: 74 BPM | WEIGHT: 228.63 LBS | OXYGEN SATURATION: 97 % | TEMPERATURE: 98 F | BODY MASS INDEX: 44.88 KG/M2 | SYSTOLIC BLOOD PRESSURE: 142 MMHG | HEIGHT: 60 IN | DIASTOLIC BLOOD PRESSURE: 92 MMHG

## 2024-10-10 DIAGNOSIS — D50.0 IRON DEFICIENCY ANEMIA DUE TO CHRONIC BLOOD LOSS: ICD-10-CM

## 2024-10-10 DIAGNOSIS — I82.431 ACUTE DEEP VEIN THROMBOSIS (DVT) OF POPLITEAL VEIN OF RIGHT LOWER EXTREMITY: Primary | ICD-10-CM

## 2024-10-10 PROCEDURE — 99214 OFFICE O/P EST MOD 30 MIN: CPT | Mod: S$PBB,,, | Performed by: INTERNAL MEDICINE

## 2024-10-10 PROCEDURE — 99999 PR PBB SHADOW E&M-EST. PATIENT-LVL III: CPT | Mod: PBBFAC,,, | Performed by: INTERNAL MEDICINE

## 2024-10-10 PROCEDURE — 99213 OFFICE O/P EST LOW 20 MIN: CPT | Mod: PBBFAC,PN | Performed by: INTERNAL MEDICINE

## 2024-10-10 PROCEDURE — 3080F DIAST BP >= 90 MM HG: CPT | Mod: CPTII,,, | Performed by: INTERNAL MEDICINE

## 2024-10-10 PROCEDURE — 3008F BODY MASS INDEX DOCD: CPT | Mod: CPTII,,, | Performed by: INTERNAL MEDICINE

## 2024-10-10 PROCEDURE — G2211 COMPLEX E/M VISIT ADD ON: HCPCS | Mod: S$PBB,,, | Performed by: INTERNAL MEDICINE

## 2024-10-10 PROCEDURE — 3077F SYST BP >= 140 MM HG: CPT | Mod: CPTII,,, | Performed by: INTERNAL MEDICINE

## 2024-10-10 PROCEDURE — 1159F MED LIST DOCD IN RCRD: CPT | Mod: CPTII,,, | Performed by: INTERNAL MEDICINE

## 2024-10-10 NOTE — PROGRESS NOTES
HPI    38 y.o. M, was presents to the ED on 6/10/24 for right lower leg swelling. Patient reports the right calf pain and swelling since 6/8/24 days ago, however, he noticed that he had right side groin pain prior to the swelling. 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 Denies any injury to his right leg. Denies chest pain, or shortness of breath.       Past Medical History:   Diagnosis Date    Pinched nerve      Social History     Socioeconomic History    Marital status: Single   Tobacco Use    Smoking status: Every Day     Current packs/day: 0.75     Types: Cigarettes   Substance and Sexual Activity    Alcohol use: No    Drug use: No     Social Drivers of Health     Financial Resource Strain: Low Risk  (6/12/2024)    Overall Financial Resource Strain (CARDIA)     Difficulty of Paying Living Expenses: Not hard at all   Food Insecurity: No Food Insecurity (6/12/2024)    Hunger Vital Sign     Worried About Running Out of Food in the Last Year: Never true     Ran Out of Food in the Last Year: Never true   Physical Activity: Sufficiently Active (6/12/2024)    Exercise Vital Sign     Days of Exercise per Week: 5 days     Minutes of Exercise per Session: 150+ min   Stress: Stress Concern Present (6/12/2024)    [employer] - Occupational Stress Questionnaire     Feeling of Stress : Rather much   Housing Stability: Unknown (6/12/2024)    Housing Stability Vital Sign     Unable to Pay for Housing in the Last Year: No         Subjective      Review of Systems   Constitutional: Negative for appetite change, fatigue and unexpected weight change.   HENT: Negative for mouth sores.   Eyes: Negative for visual disturbance.   Respiratory: Negative for cough and shortness of breath.   Cardiovascular: Negative for chest pain.   Gastrointestinal: Negative for diarrhea.   Genitourinary: Negative for frequency.   Musculoskeletal: Negative  for back pain.   Skin: Negative for rash.   Neurological: Negative for headaches.   Hematological: Negative for adenopathy.   Psychiatric/Behavioral: The patient is not nervous/anxious.   All other systems reviewed and are negative.     Objective    Physical Exam   There were no vitals filed for this visit.      Constitutional: patient is oriented to person, place, and time. patient appears well-developed and well-nourished. No distress.   HENT:   Right Ear: External ear normal.   Left Ear: External ear normal.   Nose: Nose normal.   Mouth/Throat: Oropharynx is clear and moist. No oropharyngeal exudate.   Teeth, gums and lips are normal   No sinus tenderness   Palate, tongue, posterior pharynx are normal   Eyes: Conjunctivae and lids are normal.   Neck: Trachea normal and normal range of motion. No thyromegaly   Cardiovascular: Normal rate, regular rhythm, normal heart sounds, intact distal pulses and normal pulses.   No murmur heard.   No edema, no tenderness in the extremities.   Pulmonary/Chest: Effort normal and breath sounds normal. No accessory muscle usage. patient has no wheezes..   Abdominal: Soft. Normal appearance and bowel sounds are normal. patient exhibits no distension and no mass. There is no hepatosplenomegaly. There is no tenderness.   Musculoskeletal: Normal range of motion.   Gait is normal   No clubbing, cyanosis     Lymphadenopathy:   Head (right side): No submental and no submandibular adenopathy present.   Head (left side): No submental and no submandibular adenopathy present.   patient has no cervical adenopathy.   Right: No supraclavicular adenopathy present.   Left: No supraclavicular adenopathy present.   Neurological: patient is alert and oriented to person, place, and time. patient has normal strength and normal reflexes. No sensory deficit. Gait normal.   Skin: Skin is warm, dry and intact. No bruising, no lesion and no rash noted. No cyanosis. Nails show no clubbing.   No lesions    Psychiatric: patient has a normal mood and affect. patient speech is normal and behavior is normal. Judgment normal. Cognition and memory are normal.   Vitals reviewed.     Lab Results   Component Value Date    WBC 10.87 08/22/2024    HGB 13.5 (L) 08/22/2024    HCT 41.7 08/22/2024    MCV 88 08/22/2024     08/22/2024       CMP  Sodium   Date Value Ref Range Status   08/22/2024 138 136 - 145 mmol/L Final     Potassium   Date Value Ref Range Status   08/22/2024 3.6 3.5 - 5.1 mmol/L Final     Chloride   Date Value Ref Range Status   08/22/2024 104 95 - 110 mmol/L Final     CO2   Date Value Ref Range Status   08/22/2024 23 23 - 29 mmol/L Final     Glucose   Date Value Ref Range Status   08/22/2024 104 70 - 110 mg/dL Final     BUN   Date Value Ref Range Status   08/22/2024 9 6 - 20 mg/dL Final     Creatinine   Date Value Ref Range Status   08/22/2024 1.0 0.5 - 1.4 mg/dL Final     Calcium   Date Value Ref Range Status   08/22/2024 9.1 8.7 - 10.5 mg/dL Final     Total Protein   Date Value Ref Range Status   08/22/2024 7.1 6.0 - 8.4 g/dL Final     Albumin   Date Value Ref Range Status   08/22/2024 3.6 3.5 - 5.2 g/dL Final     Total Bilirubin   Date Value Ref Range Status   08/22/2024 0.3 0.1 - 1.0 mg/dL Final     Comment:     For infants and newborns, interpretation of results should be based  on gestational age, weight and in agreement with clinical  observations.    Premature Infant recommended reference ranges:  Up to 24 hours.............<8.0 mg/dL  Up to 48 hours............<12.0 mg/dL  3-5 days..................<15.0 mg/dL  6-29 days.................<15.0 mg/dL       Alkaline Phosphatase   Date Value Ref Range Status   08/22/2024 91 55 - 135 U/L Final     AST   Date Value Ref Range Status   08/22/2024 12 10 - 40 U/L Final     ALT   Date Value Ref Range Status   08/22/2024 25 10 - 44 U/L Final     Anion Gap   Date Value Ref Range Status   08/22/2024 11 8 - 16 mmol/L Final     eGFR   Date Value Ref Range Status    08/22/2024 >60.0 >60 mL/min/1.73 m^2 Final       Impression:     Right popliteal vein thrombosed.  Unclear whether not this is provoked or spontaneous.  Patient does endorse long hours of driving daily.  However from my discussion with patient he spent anywhere between 3-4 hours daily in car.  Does not seen that will be a provoked  factor.  In addition patient denies any drugs or hormone placement/EPO.    Family history is negative for DVT except for maternal grandmother    Currently on anticoagulation Eliquis 5 mg p.o. b.i.d.    CT chest abd pelvi with IV contrast hypercoagulable workup RTC with results    Heavy smoking history 1 pack daily for the past 20 years.    Patient returns to clinic for evaluation of hypercoagulable workup.  There is no evidence of lupus anticoagulant.  Negative on factor 5 Leiden prothrombin gene mutations.  No evidence of antiphospholipid lipid antibodies.  His Beta-2 glycoprotein antibodies slightly elevated but is not significant.  CTA shows no filling defect, no evidence of pulmonary embolism.  US lower ext 6/10/24 right popliteal vein thrombosis and with repeat US on 9/26/24 no evidence of thrombosis in the right lower extremity.  Interval resolution of previously described right popliteal venous thrombosis.    Repeat beta 2 glycoprotein IgM still slightly elevated but clinically insignificant interpretation as negative    Iron deficiency without evidence of anemia.  Oral iron supplement Monday through Thursday we will repeat the iron panel in 3 months.    Discuss continue on oral anticoagulation versus aspirin versus anticoagulation prophylaxis.  Patient elected to do Xarelto 10 mg p.o. daily for prophylaxis.  Patient understands risk and benefit.          Assessment        Plan    There are no diagnoses linked to this encounter.

## 2025-01-27 ENCOUNTER — OFFICE VISIT (OUTPATIENT)
Dept: HEMATOLOGY/ONCOLOGY | Facility: CLINIC | Age: 40
End: 2025-01-27
Payer: MEDICAID

## 2025-01-27 ENCOUNTER — PATIENT MESSAGE (OUTPATIENT)
Dept: HEMATOLOGY/ONCOLOGY | Facility: CLINIC | Age: 40
End: 2025-01-27

## 2025-01-27 DIAGNOSIS — Z86.718 HISTORY OF DVT (DEEP VEIN THROMBOSIS): Primary | ICD-10-CM

## 2025-01-27 NOTE — PROGRESS NOTES
The patient location is: home  The chief complaint leading to consultation is:  History of DVT    Visit type: audiovisual    Face to Face time with patient: 10  20 minutes of total time spent on the encounter, which includes face to face time and non-face to face time preparing to see the patient (eg, review of tests), Obtaining and/or reviewing separately obtained history, Documenting clinical information in the electronic or other health record, Independently interpreting results (not separately reported) and communicating results to the patient/family/caregiver, or Care coordination (not separately reported).         Each patient to whom he or she provides medical services by telemedicine is:  (1) informed of the relationship between the physician and patient and the respective role of any other health care provider with respect to management of the patient; and (2) notified that he or she may decline to receive medical services by telemedicine and may withdraw from such care at any time.    Notes:       HPI    39 y.o. M, was presents to the ED on 6/10/24 for right lower leg swelling. Patient reports the right calf pain and swelling since 6/8/24 days ago, however, he noticed that he had right side groin pain prior to the swelling. 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 Denies any injury to his right leg. Denies chest pain, or shortness of breath.       Past Medical History:   Diagnosis Date    Pinched nerve      Social History     Socioeconomic History    Marital status: Single   Tobacco Use    Smoking status: Every Day     Current packs/day: 0.75     Types: Cigarettes    Tobacco comments:     Smokes 1 pack cigarettes daily   Substance and Sexual Activity    Alcohol use: No    Drug use: No     Social Drivers of Health     Financial Resource Strain: Low Risk  (6/12/2024)    Overall Financial Resource Strain (CARDIA)     Difficulty of Paying Living  Expenses: Not hard at all   Food Insecurity: No Food Insecurity (6/12/2024)    Hunger Vital Sign     Worried About Running Out of Food in the Last Year: Never true     Ran Out of Food in the Last Year: Never true   Physical Activity: Sufficiently Active (6/12/2024)    Exercise Vital Sign     Days of Exercise per Week: 5 days     Minutes of Exercise per Session: 150+ min   Stress: Stress Concern Present (6/12/2024)    [employer] [place] [employer] - Occupational Stress Questionnaire     Feeling of Stress : Rather much   Housing Stability: Unknown (6/12/2024)    Housing Stability Vital Sign     Unable to Pay for Housing in the Last Year: No         Subjective      Review of Systems   Constitutional: Negative for appetite change, fatigue and unexpected weight change.   HENT: Negative for mouth sores.   Eyes: Negative for visual disturbance.   Respiratory: Negative for cough and shortness of breath.   Cardiovascular: Negative for chest pain.   Gastrointestinal: Negative for diarrhea.   Genitourinary: Negative for frequency.   Musculoskeletal: Negative for back pain.   Skin: Negative for rash.   Neurological: Negative for headaches.   Hematological: Negative for adenopathy.   Psychiatric/Behavioral: The patient is not nervous/anxious.   All other systems reviewed and are negative.     Objective    Physical Exam   VV      Lab Results   Component Value Date    WBC 12.51 01/24/2025    HGB 13.3 (L) 01/24/2025    HCT 40.6 01/24/2025    MCV 88 01/24/2025     01/24/2025       CMP  Sodium   Date Value Ref Range Status   01/24/2025 136 136 - 145 mmol/L Final     Potassium   Date Value Ref Range Status   01/24/2025 3.6 3.5 - 5.1 mmol/L Final     Chloride   Date Value Ref Range Status   01/24/2025 100 95 - 110 mmol/L Final     CO2   Date Value Ref Range Status   01/24/2025 26 23 - 29 mmol/L Final     Glucose   Date Value Ref Range Status   01/24/2025 78 70 - 110 mg/dL Final     BUN   Date Value Ref Range Status    01/24/2025 10 6 - 20 mg/dL Final     Creatinine   Date Value Ref Range Status   01/24/2025 1.0 0.5 - 1.4 mg/dL Final     Calcium   Date Value Ref Range Status   01/24/2025 9.0 8.7 - 10.5 mg/dL Final     Total Protein   Date Value Ref Range Status   01/24/2025 7.6 6.0 - 8.4 g/dL Final     Albumin   Date Value Ref Range Status   01/24/2025 3.6 3.5 - 5.2 g/dL Final     Total Bilirubin   Date Value Ref Range Status   01/24/2025 0.2 0.1 - 1.0 mg/dL Final     Comment:     For infants and newborns, interpretation of results should be based  on gestational age, weight and in agreement with clinical  observations.    Premature Infant recommended reference ranges:  Up to 24 hours.............<8.0 mg/dL  Up to 48 hours............<12.0 mg/dL  3-5 days..................<15.0 mg/dL  6-29 days.................<15.0 mg/dL       Alkaline Phosphatase   Date Value Ref Range Status   01/24/2025 84 40 - 150 U/L Final     AST   Date Value Ref Range Status   01/24/2025 11 10 - 40 U/L Final     ALT   Date Value Ref Range Status   01/24/2025 24 10 - 44 U/L Final     Anion Gap   Date Value Ref Range Status   01/24/2025 10 8 - 16 mmol/L Final     eGFR   Date Value Ref Range Status   01/24/2025 >60.0 >60 mL/min/1.73 m^2 Final       Impression:     Right popliteal vein thrombosed.  Unclear whether not this is provoked or spontaneous?  Patient does endorse long hours of driving daily.  However from my discussion with patient he spent anywhere between 3-4 hours daily in car.  Does not seen that will be a provoked  factor.  In addition patient denies any drugs or hormone placement/EPO.    Family history is negative for DVT except for maternal grandmother      CT chest abd pelvi with IV contrast hypercoagulable workup RTC with results    Heavy smoking history 1 pack daily for the past 20 years.    Evaluation of hypercoagulable workup.  There is no evidence of lupus anticoagulant.  Negative on factor 5 Leiden prothrombin gene mutations.  No  evidence of antiphospholipid lipid antibodies.  His Beta-2 glycoprotein antibodies slightly elevated but is not significant.  CTA shows no filling defect, no evidence of pulmonary embolism.  US lower ext 6/10/24 right popliteal vein thrombosis and with repeat US on 9/26/24 no evidence of thrombosis in the right lower extremity.  Interval resolution of previously described right popliteal venous thrombosis.    Repeat beta 2 glycoprotein IgM still slightly elevated but clinically insignificant interpretation as negative    Iron deficiency without evidence of anemia.  Oral iron supplement Monday through Thursday we will repeat the iron panel in 3 months.    Discuss oral anticoagulation versus aspirin versus anticoagulation prophylaxis.  Patient elected to do Xarelto 10 mg p.o. daily for prophylaxis but self discontinued medication.  With that instructed patient to take aspirin daily.  We will recheck patient's lab in about 3 months.          Assessment        Plan    There are no diagnoses linked to this encounter.

## 2025-04-29 ENCOUNTER — PATIENT MESSAGE (OUTPATIENT)
Dept: HEMATOLOGY/ONCOLOGY | Facility: CLINIC | Age: 40
End: 2025-04-29
Payer: MEDICAID

## 2025-04-30 ENCOUNTER — OFFICE VISIT (OUTPATIENT)
Dept: HEMATOLOGY/ONCOLOGY | Facility: CLINIC | Age: 40
End: 2025-04-30
Payer: MEDICAID

## 2025-04-30 DIAGNOSIS — D50.0 IRON DEFICIENCY ANEMIA DUE TO CHRONIC BLOOD LOSS: ICD-10-CM

## 2025-04-30 DIAGNOSIS — Z86.718 HISTORY OF DVT (DEEP VEIN THROMBOSIS): Primary | ICD-10-CM

## 2025-04-30 PROCEDURE — 98005 SYNCH AUDIO-VIDEO EST LOW 20: CPT | Mod: 95,,, | Performed by: INTERNAL MEDICINE

## 2025-04-30 NOTE — PROGRESS NOTES
The patient location is: home  The chief complaint leading to consultation is:  History of DVT    Visit type: audiovisual    Face to Face time with patient: 10  20 minutes of total time spent on the encounter, which includes face to face time and non-face to face time preparing to see the patient (eg, review of tests), Obtaining and/or reviewing separately obtained history, Documenting clinical information in the electronic or other health record, Independently interpreting results (not separately reported) and communicating results to the patient/family/caregiver, or Care coordination (not separately reported).         Each patient to whom he or she provides medical services by telemedicine is:  (1) informed of the relationship between the physician and patient and the respective role of any other health care provider with respect to management of the patient; and (2) notified that he or she may decline to receive medical services by telemedicine and may withdraw from such care at any time.    Notes:       HPI    39 y.o. M, was presents to the ED on 6/10/24 for right lower leg swelling. Patient reports the right calf pain and swelling since 6/8/24 days ago, however, he noticed that he had right side groin pain prior to the swelling. 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 Denies any injury to his right leg. Denies chest pain, or shortness of breath.       Past Medical History:   Diagnosis Date    Pinched nerve      Social History     Socioeconomic History    Marital status: Single   Tobacco Use    Smoking status: Every Day     Current packs/day: 0.75     Types: Cigarettes    Tobacco comments:     Smokes 1 pack cigarettes daily   Substance and Sexual Activity    Alcohol use: No    Drug use: No     Social Drivers of Health     Financial Resource Strain: Low Risk  (4/28/2025)    Overall Financial Resource Strain (CARDIA)     Difficulty of Paying Living  Expenses: Not very hard   Food Insecurity: No Food Insecurity (4/28/2025)    Hunger Vital Sign     Worried About Running Out of Food in the Last Year: Never true     Ran Out of Food in the Last Year: Never true   Transportation Needs: No Transportation Needs (4/28/2025)    PRAPARE - Transportation     Lack of Transportation (Medical): No     Lack of Transportation (Non-Medical): No   Recent Concern: Transportation Needs - High Risk (3/3/2025)    Received from [facility] SDOH Screening     Do you need transportation to these services?: Requires an immediate (near future) ride to a social service provider (i.e. bus passes, on demand...     Based on information thus far what are the top priorities? (note: do not select more than 3.): Transportation   Physical Activity: Sufficiently Active (4/28/2025)    Exercise Vital Sign     Days of Exercise per Week: 6 days     Minutes of Exercise per Session: 150+ min   Stress: No Stress Concern Present (4/28/2025)    [employer] [place] [employer] - Occupational Stress Questionnaire     Feeling of Stress : Only a little   Housing Stability: Low Risk  (4/28/2025)    Housing Stability Vital Sign     Unable to Pay for Housing in the Last Year: No     Number of Times Moved in the Last Year: 0     Homeless in the Last Year: No         Subjective      Review of Systems   Constitutional: Negative for appetite change, fatigue and unexpected weight change.   HENT: Negative for mouth sores.   Eyes: Negative for visual disturbance.   Respiratory: Negative for cough and shortness of breath.   Cardiovascular: Negative for chest pain.   Gastrointestinal: Negative for diarrhea.   Genitourinary: Negative for frequency.   Musculoskeletal: Negative for back pain.   Skin: Negative for rash.   Neurological: Negative for headaches.   Hematological: Negative for adenopathy.   Psychiatric/Behavioral: The patient is not nervous/anxious.   All other systems reviewed and are  negative.     Objective    Physical Exam   VV      Lab Results   Component Value Date    WBC 11.64 04/22/2025    HGB 15.4 04/22/2025    HCT 46.6 04/22/2025    MCV 89 04/22/2025     04/22/2025       CMP  Sodium   Date Value Ref Range Status   01/24/2025 136 136 - 145 mmol/L Final     Potassium   Date Value Ref Range Status   01/24/2025 3.6 3.5 - 5.1 mmol/L Final     Chloride   Date Value Ref Range Status   01/24/2025 100 95 - 110 mmol/L Final     CO2   Date Value Ref Range Status   01/24/2025 26 23 - 29 mmol/L Final     Glucose   Date Value Ref Range Status   01/24/2025 78 70 - 110 mg/dL Final     BUN   Date Value Ref Range Status   01/24/2025 10 6 - 20 mg/dL Final     Creatinine   Date Value Ref Range Status   01/24/2025 1.0 0.5 - 1.4 mg/dL Final     Calcium   Date Value Ref Range Status   01/24/2025 9.0 8.7 - 10.5 mg/dL Final     Total Protein   Date Value Ref Range Status   01/24/2025 7.6 6.0 - 8.4 g/dL Final     Albumin   Date Value Ref Range Status   01/24/2025 3.6 3.5 - 5.2 g/dL Final     Total Bilirubin   Date Value Ref Range Status   01/24/2025 0.2 0.1 - 1.0 mg/dL Final     Comment:     For infants and newborns, interpretation of results should be based  on gestational age, weight and in agreement with clinical  observations.    Premature Infant recommended reference ranges:  Up to 24 hours.............<8.0 mg/dL  Up to 48 hours............<12.0 mg/dL  3-5 days..................<15.0 mg/dL  6-29 days.................<15.0 mg/dL       Alkaline Phosphatase   Date Value Ref Range Status   01/24/2025 84 40 - 150 U/L Final     AST   Date Value Ref Range Status   01/24/2025 11 10 - 40 U/L Final     ALT   Date Value Ref Range Status   01/24/2025 24 10 - 44 U/L Final     Anion Gap   Date Value Ref Range Status   01/24/2025 10 8 - 16 mmol/L Final     eGFR   Date Value Ref Range Status   01/24/2025 >60.0 >60 mL/min/1.73 m^2 Final       Impression:     Right popliteal vein thrombosed.  Unclear whether not this is  provoked or spontaneous?  Patient does endorse long hours of driving daily.  However from my discussion with patient he spent anywhere between 3-4 hours daily in car.  Does not seen that will be a provoked  factor.  In addition patient denies any drugs or hormone placement/EPO.    Family history is negative for DVT except for maternal grandmother      CT chest abd pelvi with IV contrast hypercoagulable workup RTC with results    Heavy smoking history 1 pack daily for the past 20 years.    Evaluation of hypercoagulable workup.  There is no evidence of lupus anticoagulant.  Negative on factor 5 Leiden prothrombin gene mutations.  No evidence of antiphospholipid lipid antibodies.  His Beta-2 glycoprotein antibodies slightly elevated but is not significant.  CTA shows no filling defect, no evidence of pulmonary embolism.  US lower ext 6/10/24 right popliteal vein thrombosis and with repeat US on 9/26/24 no evidence of thrombosis in the right lower extremity.  Interval resolution of previously described right popliteal venous thrombosis.    Repeat beta 2 glycoprotein IgM still slightly elevated but clinically insignificant interpretation as negative    Iron deficiency without evidence of anemia.  Oral iron supplement Monday through Thursday we will repeat the iron panel in 3 months.    Discuss oral anticoagulation versus aspirin versus anticoagulation prophylaxis.  Patient elected to do Xarelto 10 mg p.o. daily for prophylaxis. With that instructed patient to take aspirin daily.  We will recheck patient's lab in about 3 months.          Assessment        Plan    There are no diagnoses linked to this encounter.

## 2025-07-30 ENCOUNTER — OFFICE VISIT (OUTPATIENT)
Dept: HEMATOLOGY/ONCOLOGY | Facility: CLINIC | Age: 40
End: 2025-07-30
Payer: MEDICAID

## 2025-07-30 DIAGNOSIS — Z86.718 HISTORY OF DVT (DEEP VEIN THROMBOSIS): ICD-10-CM

## 2025-07-30 DIAGNOSIS — D50.0 IRON DEFICIENCY ANEMIA DUE TO CHRONIC BLOOD LOSS: Primary | ICD-10-CM

## 2025-07-30 NOTE — PROGRESS NOTES
The patient location is: [place]  The chief complaint leading to consultation is: DVT    Visit type: audiovisual    Face to Face time with patient: 10  20 minutes of total time spent on the encounter, which includes face to face time and non-face to face time preparing to see the patient (eg, review of tests), Obtaining and/or reviewing separately obtained history, Documenting clinical information in the electronic or other health record, Independently interpreting results (not separately reported) and communicating results to the patient/family/caregiver, or Care coordination (not separately reported).         Each patient to whom he or she provides medical services by telemedicine is:  (1) informed of the relationship between the physician and patient and the respective role of any other health care provider with respect to management of the patient; and (2) notified that he or she may decline to receive medical services by telemedicine and may withdraw from such care at any time.    Notes:   The patient location is: [place]  The chief complaint leading to consultation is:  History of DVT    Visit type: audiovisual    Face to Face time with patient: 10  20 minutes of total time spent on the encounter, which includes face to face time and non-face to face time preparing to see the patient (eg, review of tests), Obtaining and/or reviewing separately obtained history, Documenting clinical information in the electronic or other health record, Independently interpreting results (not separately reported) and communicating results to the patient/family/caregiver, or Care coordination (not separately reported).         Each patient to whom he or she provides medical services by telemedicine is:  (1) informed of the relationship between the physician and patient and the respective role of any other health care provider with respect to management of the patient; and (2) notified that he or she may decline to receive medical  services by telemedicine and may withdraw from such care at any time.    Notes:       HPI    39 y.o. M, was presents to the ED on 6/10/24 for right lower leg swelling. Patient reports the right calf pain and swelling since 6/8/24 days ago, however, he noticed that he had right side groin pain prior to the swelling. 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 Denies any injury to his right leg. Denies chest pain, or shortness of breath.       Past Medical History:   Diagnosis Date    Pinched nerve      Social History     Socioeconomic History    Marital status: Single   Tobacco Use    Smoking status: Every Day     Current packs/day: 0.75     Types: Cigarettes    Tobacco comments:     Smokes 1 pack cigarettes daily   Substance and Sexual Activity    Alcohol use: No    Drug use: No     Social Drivers of Health     Financial Resource Strain: Low Risk  (4/28/2025)    Overall Financial Resource Strain (CARDIA)     Difficulty of Paying Living Expenses: Not very hard   Food Insecurity: No Food Insecurity (4/28/2025)    Hunger Vital Sign     Worried About Running Out of Food in the Last Year: Never true     Ran Out of Food in the Last Year: Never true   Transportation Needs: No Transportation Needs (4/28/2025)    PRAPARE - Transportation     Lack of Transportation (Medical): No     Lack of Transportation (Non-Medical): No   Recent Concern: Transportation Needs - High Risk (3/3/2025)    Received from [facility] SDOH Screening     Do you need transportation to these services?: Requires an immediate (near future) ride to a social service provider (i.e. bus passes, on demand...     Based on information thus far what are the top priorities? (note: do not select more than 3.): Transportation   Physical Activity: Sufficiently Active (4/28/2025)    Exercise Vital Sign     Days of Exercise per Week: 6 days     Minutes of Exercise per Session: 150+ min    Stress: No Stress Concern Present (4/28/2025)    [employer] [place] [employer] - Occupational Stress Questionnaire     Feeling of Stress : Only a little   Housing Stability: Low Risk  (4/28/2025)    Housing Stability Vital Sign     Unable to Pay for Housing in the Last Year: No     Number of Times Moved in the Last Year: 0     Homeless in the Last Year: No             Objective    Physical Exam   VV      Lab Results   Component Value Date    WBC 8.94 07/27/2025    HGB 14.5 07/27/2025    HCT 44.5 07/27/2025    MCV 90 07/27/2025     07/27/2025       CMP  Sodium   Date Value Ref Range Status   01/24/2025 136 136 - 145 mmol/L Final     Potassium   Date Value Ref Range Status   01/24/2025 3.6 3.5 - 5.1 mmol/L Final     Chloride   Date Value Ref Range Status   01/24/2025 100 95 - 110 mmol/L Final     CO2   Date Value Ref Range Status   01/24/2025 26 23 - 29 mmol/L Final     Glucose   Date Value Ref Range Status   01/24/2025 78 70 - 110 mg/dL Final     BUN   Date Value Ref Range Status   01/24/2025 10 6 - 20 mg/dL Final     Creatinine   Date Value Ref Range Status   01/24/2025 1.0 0.5 - 1.4 mg/dL Final     Calcium   Date Value Ref Range Status   01/24/2025 9.0 8.7 - 10.5 mg/dL Final     Total Protein   Date Value Ref Range Status   01/24/2025 7.6 6.0 - 8.4 g/dL Final     Albumin   Date Value Ref Range Status   01/24/2025 3.6 3.5 - 5.2 g/dL Final     Total Bilirubin   Date Value Ref Range Status   01/24/2025 0.2 0.1 - 1.0 mg/dL Final     Comment:     For infants and newborns, interpretation of results should be based  on gestational age, weight and in agreement with clinical  observations.    Premature Infant recommended reference ranges:  Up to 24 hours.............<8.0 mg/dL  Up to 48 hours............<12.0 mg/dL  3-5 days..................<15.0 mg/dL  6-29 days.................<15.0 mg/dL       Alkaline Phosphatase   Date Value Ref Range Status   01/24/2025 84 40 - 150 U/L Final     AST   Date Value  Ref Range Status   01/24/2025 11 10 - 40 U/L Final     ALT   Date Value Ref Range Status   01/24/2025 24 10 - 44 U/L Final     Anion Gap   Date Value Ref Range Status   01/24/2025 10 8 - 16 mmol/L Final     eGFR   Date Value Ref Range Status   01/24/2025 >60.0 >60 mL/min/1.73 m^2 Final       Impression:     Right popliteal vein thrombosed.  Unclear whether not this is provoked or spontaneous?  Patient does endorse long hours of driving daily.  However from my discussion with patient he spent anywhere between 3-4 hours daily in car.  Does not seen that will be a provoked  factor.  In addition patient denies any drugs or hormone placement/EPO.    Family history is negative for DVT except for maternal grandmother  CT chest abd pelvi with IV contrast hypercoagulable workup RTC with results    Heavy smoking history 1 pack daily for the past 20 years.    Evaluation of hypercoagulable workup.  There is no evidence of lupus anticoagulant.  Negative on factor 5 Leiden prothrombin gene mutations.  No evidence of antiphospholipid lipid antibodies.  His Beta-2 glycoprotein antibodies slightly elevated but is not significant.  CTA shows no filling defect, no evidence of pulmonary embolism.  US lower ext 6/10/24 right popliteal vein thrombosis and with repeat US on 9/26/24 no evidence of thrombosis in the right lower extremity.  Interval resolution of previously described right popliteal venous thrombosis.    Repeat beta 2 glycoprotein IgM still slightly elevated but clinically insignificant interpretation as negative    Discuss oral anticoagulation versus aspirin versus anticoagulation prophylaxis.  Patient elected to do Xarelto 10 mg p.o. daily for prophylaxis. With that instructed patient to take aspirin daily.  He is doing well without any issues.  We will recheck patient's lab in about 4 month     Iron def without anemia - start oral iron daily recheck labs 4 month.       Assessment        Plan    There are no diagnoses  linked to this encounter.